# Patient Record
Sex: FEMALE | Race: WHITE | NOT HISPANIC OR LATINO | Employment: OTHER | URBAN - METROPOLITAN AREA
[De-identification: names, ages, dates, MRNs, and addresses within clinical notes are randomized per-mention and may not be internally consistent; named-entity substitution may affect disease eponyms.]

---

## 2017-09-01 ENCOUNTER — ALLSCRIPTS OFFICE VISIT (OUTPATIENT)
Dept: OTHER | Facility: OTHER | Age: 72
End: 2017-09-01

## 2017-11-21 DIAGNOSIS — Z12.31 ENCOUNTER FOR SCREENING MAMMOGRAM FOR MALIGNANT NEOPLASM OF BREAST: ICD-10-CM

## 2018-01-12 VITALS
DIASTOLIC BLOOD PRESSURE: 74 MMHG | WEIGHT: 115.38 LBS | HEIGHT: 61 IN | SYSTOLIC BLOOD PRESSURE: 116 MMHG | BODY MASS INDEX: 21.79 KG/M2

## 2018-10-04 ENCOUNTER — ANNUAL EXAM (OUTPATIENT)
Dept: OBGYN CLINIC | Facility: CLINIC | Age: 73
End: 2018-10-04
Payer: COMMERCIAL

## 2018-10-04 VITALS
HEIGHT: 61 IN | BODY MASS INDEX: 20.65 KG/M2 | WEIGHT: 109.4 LBS | DIASTOLIC BLOOD PRESSURE: 72 MMHG | SYSTOLIC BLOOD PRESSURE: 118 MMHG

## 2018-10-04 DIAGNOSIS — M81.0 OSTEOPOROSIS, UNSPECIFIED OSTEOPOROSIS TYPE, UNSPECIFIED PATHOLOGICAL FRACTURE PRESENCE: ICD-10-CM

## 2018-10-04 DIAGNOSIS — N95.2 ATROPHY OF VAGINA: ICD-10-CM

## 2018-10-04 DIAGNOSIS — D25.0 SUBMUCOUS LEIOMYOMA OF UTERUS: ICD-10-CM

## 2018-10-04 DIAGNOSIS — Z12.31 VISIT FOR SCREENING MAMMOGRAM: ICD-10-CM

## 2018-10-04 DIAGNOSIS — L90.0 LICHEN SCLEROSUS ET ATROPHICUS: ICD-10-CM

## 2018-10-04 DIAGNOSIS — Z11.51 SCREENING FOR HPV (HUMAN PAPILLOMAVIRUS): ICD-10-CM

## 2018-10-04 DIAGNOSIS — Z12.4 SCREENING FOR CERVICAL CANCER: Primary | ICD-10-CM

## 2018-10-04 PROBLEM — N90.7 VULVAR CYST: Status: RESOLVED | Noted: 2017-09-01 | Resolved: 2018-10-04

## 2018-10-04 PROBLEM — N90.7 VULVAR CYST: Status: ACTIVE | Noted: 2017-09-01

## 2018-10-04 PROCEDURE — G0145 SCR C/V CYTO,THINLAYER,RESCR: HCPCS | Performed by: OBSTETRICS & GYNECOLOGY

## 2018-10-04 PROCEDURE — 87624 HPV HI-RISK TYP POOLED RSLT: CPT | Performed by: OBSTETRICS & GYNECOLOGY

## 2018-10-04 PROCEDURE — G0101 CA SCREEN;PELVIC/BREAST EXAM: HCPCS | Performed by: OBSTETRICS & GYNECOLOGY

## 2018-10-04 RX ORDER — CLOBETASOL PROPIONATE 0.5 MG/G
OINTMENT TOPICAL 2 TIMES DAILY
COMMUNITY
Start: 2012-07-30 | End: 2018-10-04 | Stop reason: SDUPTHER

## 2018-10-04 RX ORDER — BUPROPION HYDROCHLORIDE 200 MG/1
TABLET, EXTENDED RELEASE ORAL
COMMUNITY
Start: 2011-02-17

## 2018-10-04 RX ORDER — CARBIDOPA AND LEVODOPA 50; 200 MG/1; MG/1
TABLET, EXTENDED RELEASE ORAL
COMMUNITY
Start: 2011-04-16

## 2018-10-04 RX ORDER — LORAZEPAM 0.5 MG/1
TABLET ORAL
COMMUNITY
Start: 2011-04-27

## 2018-10-04 RX ORDER — CLOBETASOL PROPIONATE 0.5 MG/G
OINTMENT TOPICAL
Qty: 30 G | Refills: 1 | Status: SHIPPED | OUTPATIENT
Start: 2018-10-04 | End: 2020-11-11 | Stop reason: SDUPTHER

## 2018-10-04 RX ORDER — SIMVASTATIN 40 MG
TABLET ORAL
COMMUNITY
Start: 2011-05-08

## 2018-10-04 NOTE — PROGRESS NOTES
Assessment/Plan:  1  Yearly exam-Pap smear done with HPV testing with patient request, self-breast awareness reviewed, calcium/vitamin-D recommendations discussed, mammogram request given, colonoscopy follow-up as per specialist   2   Previous exposure to HPV-patient's  was diagnosed with cancer of the tonsils with positive HPV  He is now 3 years in remission  Pap with HPV was negative July 2015  Pap with HPV was done today with disposition as per findings  3   Lichen sclerosis-patient continues to take clobetasol ointment 0 025%, using it topically 6 weeks on then 6 weeks off as recommended by Dermatology  Overall, she has only a small linear area of whitish discoloration on the left labia majora  Compared with previous exam, there are no whitish discoloration areas on the right or in the posterior midline area  This appears improved  She will continue clobetasol and prescription was sent  She will follow-up in 1 year time for exam   4   History of right vulvar cyst-none noted  5   History of osteopenia/osteoporosis-most recent DEXA scan 11/21/16 demonstrated osteopenia with focal osteoporosis  Patient did use calcium, vitamin-D, and weight-bearing exercise but declined medical treatment at that time  Repeat DEXA scan was ordered, postdated 11/21/18  We will proceed accordingly  6   History of submucous myoma-no recent bleeding  7   Vaginal atrophy-patient without symptoms  She is sexually active without any issues  She will call with any problems  Otherwise, she will follow up in 1 year for yearly exam/follow-up exam or as needed  No problem-specific Assessment & Plan notes found for this encounter  Diagnoses and all orders for this visit:    Screening for cervical cancer  -     Liquid-based pap, screening    Screening for HPV (human papillomavirus)  -     Liquid-based pap, screening    Visit for screening mammogram  -     Mammo screening bilateral w cad;  Future    Osteoporosis, unspecified osteoporosis type, unspecified pathological fracture presence  -     DXA bone density spine hip and pelvis; Future    Lichen sclerosus et atrophicus  -     clobetasol (TEMOVATE) 0 05 % ointment; Apply topically daily at bedtime as needed (Vulvar irritation/lichen sclerosis changes)    Submucous leiomyoma of uterus    Other orders  -     buPROPion (WELLBUTRIN SR) 200 MG 12 hr tablet; Take by mouth  -     carbidopa-levodopa (SINEMET CR)  mg per tablet; Take by mouth  -     Discontinue: clobetasol (TEMOVATE) 0 05 % ointment; Apply topically 2 (two) times a day  -     LORazepam (ATIVAN) 0 5 mg tablet; Take by mouth  -     simvastatin (ZOCOR) 40 mg tablet; Take by mouth          Subjective:      Patient ID: Ariela Trinidad is a 68 y o  female  Patient was seen today for yearly exam   Please see assessment plan for details  The following portions of the patient's history were reviewed and updated as appropriate: allergies, current medications, past family history, past medical history, past social history, past surgical history and problem list     Review of Systems   Constitutional: Negative for chills, diaphoresis, fatigue and fever  Respiratory: Negative for apnea, cough, chest tightness, shortness of breath and wheezing  Cardiovascular: Negative for chest pain, palpitations and leg swelling  Gastrointestinal: Negative for abdominal distention, abdominal pain, anal bleeding, constipation, diarrhea, nausea, rectal pain and vomiting  Genitourinary: Negative for difficulty urinating, dyspareunia, dysuria, frequency, hematuria, menstrual problem, pelvic pain, urgency, vaginal bleeding, vaginal discharge and vaginal pain  Musculoskeletal: Negative for arthralgias, back pain and myalgias  Skin: Negative for color change and rash  Neurological: Negative for dizziness, syncope, light-headedness, numbness and headaches  Hematological: Negative for adenopathy   Does not bruise/bleed easily  Psychiatric/Behavioral: Negative for dysphoric mood and sleep disturbance  The patient is not nervous/anxious  Objective:      /72 (BP Location: Left arm, Patient Position: Sitting, Cuff Size: Standard)   Ht 5' 1" (1 549 m)   Wt 49 6 kg (109 lb 6 4 oz)   BMI 20 67 kg/m²          Physical Exam    Objective      /72 (BP Location: Left arm, Patient Position: Sitting, Cuff Size: Standard)   Ht 5' 1" (1 549 m)   Wt 49 6 kg (109 lb 6 4 oz)   BMI 20 67 kg/m²     General:   alert and oriented, in no acute distress, alert, appears stated age and cooperative   Neck: normal to inspection and palpation   Breast: normal appearance, no masses or tenderness   Heart:    Lungs:    Abdomen: soft, non-tender, without masses or organomegaly   Vulva: Atrophic, with small area of linear white discoloration noted at the midportion of the left labia majora  No raised or firm or erythematous areas are appreciated  Vagina: Atrophic, without erythema or lesions or discharge  Cervix: Atrophic, without lesions or discharge or cervicitis    No Cervical motion tenderness   Uterus: top normal size, anteverted, non-tender   Adnexa: no mass, fullness, tenderness   Rectum: negative

## 2018-10-04 NOTE — PATIENT INSTRUCTIONS
Osteoporosis   WHAT YOU NEED TO KNOW:   What is osteoporosis? Osteoporosis is a long-term medical condition that causes your bones to become weak, brittle, and more likely to fracture  Osteoporosis occurs when your body absorbs more bone than it makes  It is also caused by a lack of calcium and estrogen (female hormone)  What increases my risk for osteoporosis? · Age older than 28    · Low estrogen levels    · Female gender    · Alcohol, tobacco, or caffeine    · Lack of calcium and vitamin D in your foods    · Lack of exercise    · Some illnesses, such as thyroid diseases, bone cancer, and long-term lung diseases    · Certain medicines, such as steroids, anticonvulsants, and blood-thinners  What are the signs and symptoms of osteoporosis? You may not have any signs or symptoms  You may break a bone after a muscle strain, bump, or fall  A break usually occurs in the hip, spine, or wrist  A collapsed vertebra (bone in your spine) may cause severe back pain or loss of height from bent posture  How is osteoporosis diagnosed? · Blood and urine tests  measure your calcium, vitamin D, and estrogen levels  · An x-ray or CT  may show thinned bones or a fracture  You may be given contrast liquid to help the bones show up better in the pictures  Tell the healthcare provider if you have ever had an allergic reaction to contrast liquid  Do not enter the MRI room with anything metal  Metal can cause serious injury  Tell the healthcare provider if you have any metal in or on your body  · A bone density test  compares your bone thickness with what is expected for someone of your age, gender, and ethnicity  How is osteoporosis treated? Medicines may be given to prevent bone loss, build new bone, and increase estrogen  These medicines help prevent fractures and may be given as a pill or injection  Ask your healthcare provider for more information on these medicines  How can I help prevent bone loss?    · Eat healthy foods that are high in calcium  This helps keep your bones strong  Good sources of calcium are milk, cheese, broccoli, tofu, almonds, and canned salmon and sardines  · Increase your vitamin D intake  Vitamin D is in fish oils, some vegetables, and fortified milk, cereal, and bread  Vitamin D is also formed in the skin when it is exposed to the sun  Ask your healthcare provider how much sunlight is safe for you  · Drink liquids as directed  Ask your healthcare provider how much liquid to drink each day and which liquids are best for you  Do not have alcohol or caffeine  They decrease bone mineral density, which can weaken your bones  · Exercise regularly  Ask your healthcare provider about the best exercise plan for you  Weight bearing exercise for 30 minutes, 3 times a week can help build and strengthen bone  · Do not smoke  Nicotine and other chemicals in cigarettes and cigars can cause lung damage  Ask your healthcare provider for information if you currently smoke and need help to quit  E-cigarettes or smokeless tobacco still contain nicotine  Talk to your healthcare provider before you use these products  · Go to physical therapy as directed  A physical therapist teaches you exercises to help improve movement and muscle strength  When should I seek immediate care? · You have severe pain  When should I contact my healthcare provider? · You have increasing pain after a fall  · You have pain when you do your daily activities  · You have questions or concerns about your condition or care  CARE AGREEMENT:   You have the right to help plan your care  Learn about your health condition and how it may be treated  Discuss treatment options with your caregivers to decide what care you want to receive  You always have the right to refuse treatment  The above information is an  only  It is not intended as medical advice for individual conditions or treatments  Talk to your doctor, nurse or pharmacist before following any medical regimen to see if it is safe and effective for you  © 2017 [employer] Information is for End User's use only and may not be sold, redistributed or otherwise used for commercial purposes  All illustrations and images included in CareNotes® are the copyrighted property of [employer]  or [employer]  Lichen Sclerosus   WHAT YOU NEED TO KNOW:   What is lichen sclerosus? Lichen sclerosus is a skin condition that most often affects the vulva, penis, or skin around the anus  The cause of lichen sclerosus may not be known  What are the signs and symptoms of lichen sclerosus? You may not have any symptoms, or you may have any of the following:  · Pain, itching, or burning    · Areas of skin that are thin, wrinkled, and white    · Blisters    · Bleeding, bruises, or tears on affected skin  How is lichen sclerosus diagnosed? Your healthcare provider will ask about your symptoms  He or she will also examine your skin  A sample of your skin may be taken and sent to a lab for tests  How is lichen sclerosus treated or managed? You do not need treatment if you do not have any symptoms  Your healthcare provider may recommend a steroid cream or ointment  Your provider may also recommend a topical medicine that prevents your immune system from attacking your skin  If you are female, your healthcare provider may recommend that you do not take bubble baths, or use scented soaps and scented detergents  This may help decrease irritation of the vulva  Rarely, adults may need surgery to repair scarring that can occur over time  When should I contact my healthcare provider? · Your symptoms do not get better with treatment  · You have questions or concerns about your condition or care  CARE AGREEMENT:   You have the right to help plan your care  Learn about your health condition and how it may be treated   Discuss treatment options with your caregivers to decide what care you want to receive  You always have the right to refuse treatment  The above information is an  only  It is not intended as medical advice for individual conditions or treatments  Talk to your doctor, nurse or pharmacist before following any medical regimen to see if it is safe and effective for you  © 2017 [employer] Information is for End User's use only and may not be sold, redistributed or otherwise used for commercial purposes  All illustrations and images included in CareNotes® are the copyrighted property of [employer] M [employer]  or [employer]ion Cunningham

## 2018-10-09 LAB
HPV HR 12 DNA CVX QL NAA+PROBE: NEGATIVE
HPV16 DNA CVX QL NAA+PROBE: NEGATIVE
HPV18 DNA CVX QL NAA+PROBE: NEGATIVE
LAB AP GYN PRIMARY INTERPRETATION: NORMAL
Lab: NORMAL

## 2018-10-10 ENCOUNTER — TELEPHONE (OUTPATIENT)
Dept: OBGYN CLINIC | Facility: CLINIC | Age: 73
End: 2018-10-10

## 2019-01-25 ENCOUNTER — TELEPHONE (OUTPATIENT)
Dept: OBGYN CLINIC | Facility: CLINIC | Age: 74
End: 2019-01-25

## 2019-01-25 NOTE — TELEPHONE ENCOUNTER
----- Message from James Mathur MD sent at 1/21/2019  8:10 AM EST -----  DEXA scan with osteopenia in the spine and osteopenia with focal osteoporosis in the hip  This is similar to her previous DEXA scan, but density has decreased 1 1% in the spine and 4 3% in the hip  Patient has previously refused any medications and she has continue with weight-bearing exercise, calcium, and vitamin-D  Please inform her the findings  If she is interested in considering medications, would recommend office visit to discuss this    Thanks

## 2019-01-25 NOTE — TELEPHONE ENCOUNTER
----- Message from Urszula Trinh MD sent at 1/21/2019  8:10 AM EST -----  DEXA scan with osteopenia in the spine and osteopenia with focal osteoporosis in the hip  This is similar to her previous DEXA scan, but density has decreased 1 1% in the spine and 4 3% in the hip  Patient has previously refused any medications and she has continue with weight-bearing exercise, calcium, and vitamin-D  Please inform her the findings  If she is interested in considering medications, would recommend office visit to discuss this    Thanks

## 2019-10-09 ENCOUNTER — ANNUAL EXAM (OUTPATIENT)
Dept: OBGYN CLINIC | Facility: CLINIC | Age: 74
End: 2019-10-09
Payer: COMMERCIAL

## 2019-10-09 VITALS
WEIGHT: 109 LBS | HEIGHT: 61 IN | SYSTOLIC BLOOD PRESSURE: 104 MMHG | DIASTOLIC BLOOD PRESSURE: 68 MMHG | BODY MASS INDEX: 20.58 KG/M2

## 2019-10-09 DIAGNOSIS — L90.0 LICHEN SCLEROSUS ET ATROPHICUS: ICD-10-CM

## 2019-10-09 DIAGNOSIS — M81.0 OSTEOPOROSIS, UNSPECIFIED OSTEOPOROSIS TYPE, UNSPECIFIED PATHOLOGICAL FRACTURE PRESENCE: ICD-10-CM

## 2019-10-09 DIAGNOSIS — D25.0 SUBMUCOUS LEIOMYOMA OF UTERUS: ICD-10-CM

## 2019-10-09 DIAGNOSIS — Z01.419 WOMEN'S ANNUAL ROUTINE GYNECOLOGICAL EXAMINATION: ICD-10-CM

## 2019-10-09 DIAGNOSIS — Z12.31 ENCOUNTER FOR SCREENING MAMMOGRAM FOR MALIGNANT NEOPLASM OF BREAST: Primary | ICD-10-CM

## 2019-10-09 DIAGNOSIS — N95.2 VAGINAL ATROPHY: ICD-10-CM

## 2019-10-09 PROCEDURE — G0101 CA SCREEN;PELVIC/BREAST EXAM: HCPCS | Performed by: OBSTETRICS & GYNECOLOGY

## 2019-10-09 NOTE — PATIENT INSTRUCTIONS
Vaginal Atrophy   WHAT YOU NEED TO KNOW:   What is vaginal atrophy? Vaginal atrophy is a condition that causes thinning, drying, and inflammation of vaginal tissue  This condition is caused by decreased levels of estrogen (a female sex hormone)  Vaginal atrophy can increase your risk for vaginal and urinary tract infections  Vaginal atrophy can worsen over time if not treated  What causes or increases your risk of vaginal atrophy? · Menopause     · Medicines that lower your estrogen levels, such as those used to treat breast cancer, endometriosis, or fibroids    · Radiation to your pelvic area     · Surgery to remove the ovaries    · Breastfeeding  What are the signs and symptoms of vaginal atrophy? · Vaginal dryness, itching, and burning    · Vaginal discharge    · Pain or discomfort during sex    · Light bleeding after sex    · Burning during urination    · Frequent, sudden, strong urges to urinate    · Urinary incontinence (loss of control of your bladder)  How is vaginal atrophy diagnosed? Your healthcare provider will ask about your symptoms  A pelvic exam will be done to examine your vagina and cervix  Your healthcare provider will place a speculum into your vagina to open and examine it  A sample of discharge from your vagina may be collected and tested  A urine test may also be done  How is vaginal atrophy treated? · Over-the counter vaginal moisturizers  can help reduce dryness  Your healthcare provider may recommend that you use a vaginal moisturizer several times each week and during sex  Only use creams that are made for vaginal use  Do  not  use petroleum jelly  Lubricants can be used during sex to decrease pain and discomfort  · Estrogen  may help decrease dryness  It may also lower your risk of vaginal infections if you are going through menopause  It can also help to relieve urinary symptoms  Estrogen may be prescribed in the form of a cream, tablet, or ring   These medicines can be applied or inserted into the vagina  Estrogen can also be prescribed in the form of a pill  When should I contact my healthcare provider? · You have a foul-smelling odor coming from your vagina  · You have a thick, cheese-like discharge from your vagina  · You have itching, swelling, or redness in your vagina  · You have pain or burning when you urinate  · Your urine smells bad  · Your symptoms do not improve, or they get worse  · You have questions or concerns about your condition or care  CARE AGREEMENT:   You have the right to help plan your care  Learn about your health condition and how it may be treated  Discuss treatment options with your caregivers to decide what care you want to receive  You always have the right to refuse treatment  The above information is an  only  It is not intended as medical advice for individual conditions or treatments  Talk to your doctor, nurse or pharmacist before following any medical regimen to see if it is safe and effective for you  © 2017 [employer] Information is for End User's use only and may not be sold, redistributed or otherwise used for commercial purposes  All illustrations and images included in CareNotes® are the copyrighted property of [employer]  or [employer] Cunningham  Lichen Sclerosus   WHAT YOU NEED TO KNOW:   What is lichen sclerosus? Lichen sclerosus is a skin condition that most often affects the vulva, penis, or skin around the anus  The cause of lichen sclerosus may not be known  What are the signs and symptoms of lichen sclerosus? You may not have any symptoms, or you may have any of the following:  · Pain, itching, or burning    · Areas of skin that are thin, wrinkled, and white    · Blisters    · Bleeding, bruises, or tears on affected skin  How is lichen sclerosus diagnosed? Your healthcare provider will ask about your symptoms  He or she will also examine your skin   A sample of your skin may be taken and sent to a lab for tests  How is lichen sclerosus treated or managed? You do not need treatment if you do not have any symptoms  Your healthcare provider may recommend a steroid cream or ointment  Your provider may also recommend a topical medicine that prevents your immune system from attacking your skin  If you are female, your healthcare provider may recommend that you do not take bubble baths, or use scented soaps and scented detergents  This may help decrease irritation of the vulva  Rarely, adults may need surgery to repair scarring that can occur over time  When should I contact my healthcare provider? · Your symptoms do not get better with treatment  · You have questions or concerns about your condition or care  CARE AGREEMENT:   You have the right to help plan your care  Learn about your health condition and how it may be treated  Discuss treatment options with your caregivers to decide what care you want to receive  You always have the right to refuse treatment  The above information is an  only  It is not intended as medical advice for individual conditions or treatments  Talk to your doctor, nurse or pharmacist before following any medical regimen to see if it is safe and effective for you  © 2017 [address] [facility] Information is for End User's use only and may not be sold, redistributed or otherwise used for commercial purposes  All illustrations and images included in CareNotes® are the copyrighted property of [employer]  or [employer] Cunningham  Osteoporosis   WHAT YOU NEED TO KNOW:   What is osteoporosis? Osteoporosis is a long-term medical condition that causes your bones to become weak, brittle, and more likely to fracture  Osteoporosis occurs when your body absorbs more bone than it makes  It is also caused by a lack of calcium and estrogen (female hormone)  What increases my risk for osteoporosis?    · Age older than 35    · Low estrogen levels    · Female gender    · Alcohol, tobacco, or caffeine    · Lack of calcium and vitamin D in your foods    · Lack of exercise    · Some illnesses, such as thyroid diseases, bone cancer, and long-term lung diseases    · Certain medicines, such as steroids, anticonvulsants, and blood-thinners  What are the signs and symptoms of osteoporosis? You may not have any signs or symptoms  You may break a bone after a muscle strain, bump, or fall  A break usually occurs in the hip, spine, or wrist  A collapsed vertebra (bone in your spine) may cause severe back pain or loss of height from bent posture  How is osteoporosis diagnosed? · Blood and urine tests  measure your calcium, vitamin D, and estrogen levels  · An x-ray or CT  may show thinned bones or a fracture  You may be given contrast liquid to help the bones show up better in the pictures  Tell the healthcare provider if you have ever had an allergic reaction to contrast liquid  Do not enter the MRI room with anything metal  Metal can cause serious injury  Tell the healthcare provider if you have any metal in or on your body  · A bone density test  compares your bone thickness with what is expected for someone of your age, gender, and ethnicity  How is osteoporosis treated? Medicines may be given to prevent bone loss, build new bone, and increase estrogen  These medicines help prevent fractures and may be given as a pill or injection  Ask your healthcare provider for more information on these medicines  How can I help prevent bone loss? · Eat healthy foods that are high in calcium  This helps keep your bones strong  Good sources of calcium are milk, cheese, broccoli, tofu, almonds, and canned salmon and sardines  · Increase your vitamin D intake  Vitamin D is in fish oils, some vegetables, and fortified milk, cereal, and bread  Vitamin D is also formed in the skin when it is exposed to the sun   Ask your healthcare provider how much sunlight is safe for you  · Drink liquids as directed  Ask your healthcare provider how much liquid to drink each day and which liquids are best for you  Do not have alcohol or caffeine  They decrease bone mineral density, which can weaken your bones  · Exercise regularly  Ask your healthcare provider about the best exercise plan for you  Weight bearing exercise for 30 minutes, 3 times a week can help build and strengthen bone  · Do not smoke  Nicotine and other chemicals in cigarettes and cigars can cause lung damage  Ask your healthcare provider for information if you currently smoke and need help to quit  E-cigarettes or smokeless tobacco still contain nicotine  Talk to your healthcare provider before you use these products  · Go to physical therapy as directed  A physical therapist teaches you exercises to help improve movement and muscle strength  When should I seek immediate care? · You have severe pain  When should I contact my healthcare provider? · You have increasing pain after a fall  · You have pain when you do your daily activities  · You have questions or concerns about your condition or care  CARE AGREEMENT:   You have the right to help plan your care  Learn about your health condition and how it may be treated  Discuss treatment options with your caregivers to decide what care you want to receive  You always have the right to refuse treatment  The above information is an  only  It is not intended as medical advice for individual conditions or treatments  Talk to your doctor, nurse or pharmacist before following any medical regimen to see if it is safe and effective for you  © 2017 [address] [employer] Information is for End User's use only and may not be sold, redistributed or otherwise used for commercial purposes   All illustrations and images included in CareNotes® are the copyrighted property of [employer]  or [employer]

## 2019-10-09 NOTE — PROGRESS NOTES
Assessment/Plan   Diagnoses and all orders for this visit:    Encounter for screening mammogram for malignant neoplasm of breast  -     Mammo screening bilateral w cad; Future    Osteoporosis, unspecified osteoporosis type, unspecified pathological fracture presence    Lichen sclerosus et atrophicus    Submucous leiomyoma of uterus    Women's annual routine gynecological examination    Vaginal atrophy     1  Yearly exam-Pap smear deferred, self-breast awareness reviewed, calcium/vitamin-D recommendations discussed, mammogram request given, colonoscopy as per specialist  2  Previous exposure HPV-patient with Pap/HPV negative October 2018  Pap was deferred today as per current guidelines with patient agreement  3  Lichen sclerosis-continues to take clobetasol ointment 0 025% using it topically weekly for 6 weeks on then 6 weeks off as recommended by Dermatology  Scant whitish discoloration noted in the midportion of the labia minora noted bilaterally, without any raised or suspicious findings  Overall, this is improved from prior years  She will continue this treatment  She will call when prescription is needed  4  History of submucous myoma/right vulvar cyst-no current symptoms  5  Vaginal atrophy-patient with mild changes noted  She denies any dyspareunia  She was given the vaginal lubrication/moisturizer sheet and will call with any issues  6  Osteopenia/focal osteoporosis-patient had most recent DEXA scan January 2019 with continued osteopenia in the spine but focal osteoporosis in the left femoral neck with T-score-2 8  Previous DEXA scan from 2016 had T-score -2 7, a 4 percent decreased was noted over this time  Patient was again counseled about calcium, vitamin-D, weight-bearing exercises and was strongly encouraged to consider medications  We discussed Fosamax, Actonel, Boniva, Reclast, and Prolia  She states she discuss this with her primary doctor and no medication was started    She was counseled about increased risk for hip fracture with this finding  She does have appointment with Dr Cortez in the next few weeks and she will review with him again  Otherwise, follow-up 1 year for yearly exam or as needed  7  Other-grandchildren are ages 15 through 23  My congratulations were given  Subjective   Patient ID: Marlon Mejia is a 69 y o  female  Vitals:    10/09/19 0918   BP: 104/68     Patient was seen today for yearly exam   Please see assessment plan for details  The following portions of the patient's history were reviewed and updated as appropriate: allergies, current medications, past family history, past medical history, past social history, past surgical history and problem list   Past Medical History:   Diagnosis Date    Submucous leiomyoma of uterus 2014     History reviewed  No pertinent surgical history    OB History    Para Term  AB Living   3 2 2   1 2   SAB TAB Ectopic Multiple Live Births   1              # Outcome Date GA Lbr Leroy/2nd Weight Sex Delivery Anes PTL Lv   3 SAB            2 Term            1 Term                Current Outpatient Medications:     buPROPion (WELLBUTRIN SR) 200 MG 12 hr tablet, Take by mouth, Disp: , Rfl:     carbidopa-levodopa (SINEMET CR)  mg per tablet, Take by mouth, Disp: , Rfl:     clobetasol (TEMOVATE) 0 05 % ointment, Apply topically daily at bedtime as needed (Vulvar irritation/lichen sclerosis changes), Disp: 30 g, Rfl: 1    LORazepam (ATIVAN) 0 5 mg tablet, Take by mouth, Disp: , Rfl:     simvastatin (ZOCOR) 40 mg tablet, Take by mouth, Disp: , Rfl:   Allergies   Allergen Reactions    Aspirin      Reaction Date: ;      Social History     Socioeconomic History    Marital status: /Civil Union     Spouse name: None    Number of children: None    Years of education: None    Highest education level: None   Occupational History    None   Social Needs    Financial resource strain: None  Food insecurity:     Worry: None     Inability: None    Transportation needs:     Medical: None     Non-medical: None   Tobacco Use    Smoking status: Never Smoker    Smokeless tobacco: Never Used   Substance and Sexual Activity    Alcohol use: No    Drug use: No    Sexual activity: None   Lifestyle    Physical activity:     Days per week: None     Minutes per session: None    Stress: None   Relationships    Social connections:     Talks on phone: None     Gets together: None     Attends Voodoo service: None     Active member of club or organization: None     Attends meetings of clubs or organizations: None     Relationship status: None    Intimate partner violence:     Fear of current or ex partner: None     Emotionally abused: None     Physically abused: None     Forced sexual activity: None   Other Topics Concern    None   Social History Narrative    None     Family History   Problem Relation Age of Onset    No Known Problems Mother     No Known Problems Father        Review of Systems   Constitutional: Negative for chills, diaphoresis, fatigue and fever  Respiratory: Negative for apnea, cough, chest tightness, shortness of breath and wheezing  Cardiovascular: Negative for chest pain, palpitations and leg swelling  Gastrointestinal: Negative for abdominal distention, abdominal pain, anal bleeding, constipation, diarrhea, nausea, rectal pain and vomiting  Genitourinary: Negative for difficulty urinating, dyspareunia, dysuria, frequency, hematuria, menstrual problem, pelvic pain, urgency, vaginal bleeding, vaginal discharge and vaginal pain  Musculoskeletal: Negative for arthralgias, back pain and myalgias  Skin: Negative for color change and rash  Neurological: Negative for dizziness, syncope, light-headedness, numbness and headaches  Hematological: Negative for adenopathy  Does not bruise/bleed easily     Psychiatric/Behavioral: Negative for dysphoric mood and sleep disturbance  The patient is not nervous/anxious  Objective   Physical Exam    Objective      /68 (BP Location: Left arm, Patient Position: Sitting, Cuff Size: Large)   Ht 5' 1" (1 549 m)   Wt 49 4 kg (109 lb)   BMI 20 60 kg/m²     General:   alert and oriented, in no acute distress   Neck: normal to inspection and palpation   Breast: normal appearance, no masses or tenderness   Heart:    Lungs:    Abdomen: soft, non-tender, without masses or organomegaly   Vulva: normal, with slight whitish discoloration in the midportion of the labia minora bilaterally without raised or firm or erythematous changes  Vagina: Without erythema or lesions or discharge  Mildly atrophic   Cervix: Without lesions or discharge or cervicitis  No Cervical motion tenderness    Mildly atrophic   Uterus: top normal size, anteverted, non-tender   Adnexa: no mass, fullness, tenderness   Rectum: negative    Psych:  Normal mood and affect   Skin:  Without obvious lesions   Eyes: symmetric, with normal movements and reactivity   Musculoskeletal:  Normal muscle tone and movements appreciated

## 2020-11-11 ENCOUNTER — ANNUAL EXAM (OUTPATIENT)
Dept: OBGYN CLINIC | Facility: CLINIC | Age: 75
End: 2020-11-11
Payer: COMMERCIAL

## 2020-11-11 VITALS
BODY MASS INDEX: 20.39 KG/M2 | DIASTOLIC BLOOD PRESSURE: 78 MMHG | SYSTOLIC BLOOD PRESSURE: 118 MMHG | WEIGHT: 108 LBS | TEMPERATURE: 96.7 F | HEIGHT: 61 IN

## 2020-11-11 DIAGNOSIS — M81.0 OSTEOPOROSIS, UNSPECIFIED OSTEOPOROSIS TYPE, UNSPECIFIED PATHOLOGICAL FRACTURE PRESENCE: ICD-10-CM

## 2020-11-11 DIAGNOSIS — Z12.31 ENCOUNTER FOR SCREENING MAMMOGRAM FOR MALIGNANT NEOPLASM OF BREAST: ICD-10-CM

## 2020-11-11 DIAGNOSIS — R10.2 PELVIC PRESSURE IN FEMALE: ICD-10-CM

## 2020-11-11 DIAGNOSIS — N95.2 VAGINAL ATROPHY: ICD-10-CM

## 2020-11-11 DIAGNOSIS — L90.0 LICHEN SCLEROSUS ET ATROPHICUS: Primary | ICD-10-CM

## 2020-11-11 PROCEDURE — 99214 OFFICE O/P EST MOD 30 MIN: CPT | Performed by: OBSTETRICS & GYNECOLOGY

## 2020-11-11 RX ORDER — CLOBETASOL PROPIONATE 0.5 MG/G
OINTMENT TOPICAL
Qty: 30 G | Refills: 1 | Status: SHIPPED | OUTPATIENT
Start: 2020-11-11 | End: 2021-11-24 | Stop reason: SDUPTHER

## 2021-04-07 DIAGNOSIS — Z12.31 ENCOUNTER FOR SCREENING MAMMOGRAM FOR MALIGNANT NEOPLASM OF BREAST: ICD-10-CM

## 2021-04-07 DIAGNOSIS — M81.0 OSTEOPOROSIS, UNSPECIFIED OSTEOPOROSIS TYPE, UNSPECIFIED PATHOLOGICAL FRACTURE PRESENCE: ICD-10-CM

## 2021-11-24 ENCOUNTER — ANNUAL EXAM (OUTPATIENT)
Dept: OBGYN CLINIC | Facility: CLINIC | Age: 76
End: 2021-11-24
Payer: COMMERCIAL

## 2021-11-24 VITALS
WEIGHT: 109 LBS | HEIGHT: 61 IN | BODY MASS INDEX: 20.58 KG/M2 | DIASTOLIC BLOOD PRESSURE: 70 MMHG | SYSTOLIC BLOOD PRESSURE: 126 MMHG

## 2021-11-24 DIAGNOSIS — N95.2 VAGINAL ATROPHY: ICD-10-CM

## 2021-11-24 DIAGNOSIS — L90.0 LICHEN SCLEROSUS ET ATROPHICUS: ICD-10-CM

## 2021-11-24 DIAGNOSIS — Z01.419 WOMEN'S ANNUAL ROUTINE GYNECOLOGICAL EXAMINATION: Primary | ICD-10-CM

## 2021-11-24 DIAGNOSIS — M81.0 OSTEOPOROSIS, UNSPECIFIED OSTEOPOROSIS TYPE, UNSPECIFIED PATHOLOGICAL FRACTURE PRESENCE: ICD-10-CM

## 2021-11-24 DIAGNOSIS — Z12.31 ENCOUNTER FOR SCREENING MAMMOGRAM FOR MALIGNANT NEOPLASM OF BREAST: ICD-10-CM

## 2021-11-24 DIAGNOSIS — D25.0 SUBMUCOUS LEIOMYOMA OF UTERUS: ICD-10-CM

## 2021-11-24 PROCEDURE — G0145 SCR C/V CYTO,THINLAYER,RESCR: HCPCS | Performed by: OBSTETRICS & GYNECOLOGY

## 2021-11-24 PROCEDURE — G0101 CA SCREEN;PELVIC/BREAST EXAM: HCPCS | Performed by: OBSTETRICS & GYNECOLOGY

## 2021-11-24 RX ORDER — BUPROPION HYDROCHLORIDE 150 MG/1
TABLET, EXTENDED RELEASE ORAL
COMMUNITY
Start: 2021-10-28

## 2021-11-24 RX ORDER — LORAZEPAM 1 MG/1
TABLET ORAL
COMMUNITY
Start: 2021-11-03

## 2021-11-24 RX ORDER — CLOBETASOL PROPIONATE 0.5 MG/G
OINTMENT TOPICAL
Qty: 30 G | Refills: 2 | Status: SHIPPED | OUTPATIENT
Start: 2021-11-24

## 2021-12-01 LAB
LAB AP GYN PRIMARY INTERPRETATION: NORMAL
Lab: NORMAL

## 2022-11-30 ENCOUNTER — OFFICE VISIT (OUTPATIENT)
Dept: GYNECOLOGY | Facility: CLINIC | Age: 77
End: 2022-11-30

## 2022-11-30 VITALS
BODY MASS INDEX: 20.43 KG/M2 | SYSTOLIC BLOOD PRESSURE: 132 MMHG | WEIGHT: 108.2 LBS | DIASTOLIC BLOOD PRESSURE: 72 MMHG | HEIGHT: 61 IN

## 2022-11-30 DIAGNOSIS — Z12.31 ENCOUNTER FOR SCREENING MAMMOGRAM FOR BREAST CANCER: ICD-10-CM

## 2022-11-30 DIAGNOSIS — M81.0 OSTEOPOROSIS, UNSPECIFIED OSTEOPOROSIS TYPE, UNSPECIFIED PATHOLOGICAL FRACTURE PRESENCE: ICD-10-CM

## 2022-11-30 DIAGNOSIS — N95.2 VAGINAL ATROPHY: ICD-10-CM

## 2022-11-30 DIAGNOSIS — L90.0 LICHEN SCLEROSUS ET ATROPHICUS: Primary | ICD-10-CM

## 2022-11-30 NOTE — PROGRESS NOTES
Assessment/Plan   Diagnoses and all orders for this visit:    Lichen sclerosus et atrophicus    Encounter for screening mammogram for breast cancer  -     Mammo screening bilateral w 3d & cad; Future    Osteoporosis, unspecified osteoporosis type, unspecified pathological fracture presence  -     DXA bone density spine hip and pelvis; Future    Vaginal atrophy    1  General exam-Pap smear deferred, self-breast awareness reviewed, calcium/vitamin-D recommendations discussed, mammogram request given, colonoscopy every 5 years as per specialist   2  Lichen sclerosis-has not used clobetasol for some time now as it has not been symptomatic  Only faint hypopigmented area noted at the midline perineum, without firm or raised areas appreciated  Very faint hypopigmented area noted at the inferior portion of the right areola as well  She will continue to use clobetasol as needed  She does not require prescription today  She will call or return with any issues  Follow-up yearly evaluation  3  Prior history of HPV-Pap with HPV was negative October 2018, Pap was 11/24/21  Pap was deferred today as per current guidelines patient agreement  4  Prior history submucous myoma/pelvic pressure-no current concerns  5  Inclusion cyst-occasional cyst noted on the bilateral labia along with varicose veins  Patient counseled about this, no intervention recommended  To call or return with any symptoms  6  Vaginal atrophy-mild changes noted on exam   Vaginal lubrication/moisturizer sheet given, to use accordingly  7  Osteoporosis/osteopenia-noted from DEXA scan 4/5/21 with lumbar spine T-score-1 5, femoral neck-3 2  Calcium, vitamin-D, weight-bearing exercise recommended  She was started on Fosamax shortly after April 2021 visit by primary doctor  Request for DEXA scan given, postdated 4/5/23  Follow-up 1 year for yearly exam as needed  Subjective   Patient ID: Shanice Tan is a 69 y o  female      Vitals:    11/30/22 0853   BP: 132/72     Patient was seen today for follow-up visit  Please see assessment plan for details  The following portions of the patient's history were reviewed and updated as appropriate: allergies, current medications, past family history, past medical history, past social history, past surgical history and problem list   Past Medical History:   Diagnosis Date   • Submucous leiomyoma of uterus 2014     History reviewed  No pertinent surgical history    OB History    Para Term  AB Living   3 2 2   1 2   SAB IAB Ectopic Multiple Live Births   1              # Outcome Date GA Lbr Leroy/2nd Weight Sex Delivery Anes PTL Lv   3 SAB            2 Term            1 Term                Current Outpatient Medications:   •  buPROPion (WELLBUTRIN SR) 200 MG 12 hr tablet, Take by mouth, Disp: , Rfl:   •  carbidopa-levodopa (SINEMET CR)  mg per tablet, Take by mouth, Disp: , Rfl:   •  clobetasol (TEMOVATE) 0 05 % ointment, Apply topically daily at bedtime as needed (Vulvar irritation/lichen sclerosis changes), Disp: 30 g, Rfl: 2  •  LORazepam (ATIVAN) 0 5 mg tablet, Take by mouth, Disp: , Rfl:   •  simvastatin (ZOCOR) 40 mg tablet, Take by mouth, Disp: , Rfl:   •  buPROPion (WELLBUTRIN SR) 150 mg 12 hr tablet, , Disp: , Rfl:   •  LORazepam (ATIVAN) 1 mg tablet, , Disp: , Rfl:   Allergies   Allergen Reactions   • Aspirin      Reaction Date: ;      Social History     Socioeconomic History   • Marital status: /Civil Union     Spouse name: None   • Number of children: None   • Years of education: None   • Highest education level: None   Occupational History   • None   Tobacco Use   • Smoking status: Never   • Smokeless tobacco: Never   Vaping Use   • Vaping Use: Never used   Substance and Sexual Activity   • Alcohol use: No   • Drug use: No   • Sexual activity: Not Currently     Partners: Male   Other Topics Concern   • None   Social History Narrative   • None     Social Determinants of Health     Financial Resource Strain: Not on file   Food Insecurity: Not on file   Transportation Needs: Not on file   Physical Activity: Not on file   Stress: Not on file   Social Connections: Not on file   Intimate Partner Violence: Not on file   Housing Stability: Not on file     Family History   Problem Relation Age of Onset   • No Known Problems Mother    • No Known Problems Father        Review of Systems   Constitutional: Negative for chills, diaphoresis, fatigue and fever  Respiratory: Negative for apnea, cough, chest tightness, shortness of breath and wheezing  Cardiovascular: Negative for chest pain, palpitations and leg swelling  Gastrointestinal: Negative for abdominal distention, abdominal pain, anal bleeding, constipation, diarrhea, nausea, rectal pain and vomiting  Genitourinary: Negative for difficulty urinating, dyspareunia, dysuria, frequency, hematuria, menstrual problem, pelvic pain, urgency, vaginal bleeding, vaginal discharge and vaginal pain  Musculoskeletal: Negative for arthralgias, back pain and myalgias  Skin: Negative for color change and rash  Neurological: Negative for dizziness, syncope, light-headedness, numbness and headaches  Hematological: Negative for adenopathy  Does not bruise/bleed easily  Psychiatric/Behavioral: Negative for dysphoric mood and sleep disturbance  The patient is not nervous/anxious  Objective   Physical Exam  OBGyn Exam     Objective      /72 (BP Location: Right arm, Patient Position: Sitting)   Ht 5' 1" (1 549 m)   Wt 49 1 kg (108 lb 3 2 oz)   BMI 20 44 kg/m²     General:   alert and oriented, in no acute distress   Neck: normal to inspection and palpation   Breast: normal appearance, no masses or tenderness  Extremely faint hypopigmented area noted at the inferior portion of the areola, without raised or firm areas noted     Heart:    Lungs:    Abdomen: soft, non-tender, without masses or organomegaly   Vulva: Scant hypopigmented change noted at the midline vagina, less than 1 cm diameter  No raised or firm areas appreciated  Vagina: Mildly atrophic, without erythema or lesions or discharge  Cervix: Mildly atrophic, without lesions or discharge or cervicitis    No Cervical motion tenderness   Uterus: top normal size, anteverted, non-tender   Adnexa: no mass, fullness, tenderness   Rectum: negative    Psych:  Normal mood and affect   Skin:  Without obvious lesions   Eyes: symmetric, with normal movements and reactivity   Musculoskeletal:  Normal muscle tone and movements appreciated

## 2023-01-31 ENCOUNTER — TELEPHONE (OUTPATIENT)
Dept: GYNECOLOGY | Facility: CLINIC | Age: 78
End: 2023-01-31

## 2023-04-12 DIAGNOSIS — M81.0 OSTEOPOROSIS, UNSPECIFIED OSTEOPOROSIS TYPE, UNSPECIFIED PATHOLOGICAL FRACTURE PRESENCE: ICD-10-CM

## 2023-05-15 DIAGNOSIS — Z12.31 ENCOUNTER FOR SCREENING MAMMOGRAM FOR MALIGNANT NEOPLASM OF BREAST: ICD-10-CM

## 2023-10-23 ENCOUNTER — TELEPHONE (OUTPATIENT)
Dept: GYNECOLOGY | Facility: CLINIC | Age: 78
End: 2023-10-23

## 2023-10-23 NOTE — TELEPHONE ENCOUNTER
Patient called stating she has a a lump and feels something falling out of her vagina and is very uncomfortable. Appointment was given.

## 2023-10-24 ENCOUNTER — OFFICE VISIT (OUTPATIENT)
Dept: GYNECOLOGY | Facility: CLINIC | Age: 78
End: 2023-10-24
Payer: COMMERCIAL

## 2023-10-24 ENCOUNTER — TELEPHONE (OUTPATIENT)
Dept: GYNECOLOGY | Facility: CLINIC | Age: 78
End: 2023-10-24

## 2023-10-24 VITALS — BODY MASS INDEX: 19.58 KG/M2 | DIASTOLIC BLOOD PRESSURE: 68 MMHG | WEIGHT: 103.6 LBS | SYSTOLIC BLOOD PRESSURE: 126 MMHG

## 2023-10-24 DIAGNOSIS — N81.10 PROLAPSE OF ANTERIOR VAGINAL WALL: Primary | ICD-10-CM

## 2023-10-24 DIAGNOSIS — N81.4 UTERINE PROLAPSE: ICD-10-CM

## 2023-10-24 PROCEDURE — 99213 OFFICE O/P EST LOW 20 MIN: CPT | Performed by: OBSTETRICS & GYNECOLOGY

## 2023-10-24 RX ORDER — CHOLESTYRAMINE 4 G/9G
POWDER, FOR SUSPENSION ORAL
COMMUNITY
Start: 2023-10-12

## 2023-10-24 RX ORDER — PREGABALIN 50 MG/1
CAPSULE ORAL
COMMUNITY
Start: 2023-09-29

## 2023-10-24 RX ORDER — ALENDRONATE SODIUM 70 MG/1
TABLET ORAL
COMMUNITY
Start: 2023-07-21

## 2023-10-24 RX ORDER — DOXEPIN HYDROCHLORIDE 10 MG/1
CAPSULE ORAL
COMMUNITY
Start: 2023-08-29

## 2023-10-24 RX ORDER — METHYLPREDNISOLONE 4 MG/1
TABLET ORAL
COMMUNITY
Start: 2023-07-26

## 2023-10-24 NOTE — PROGRESS NOTES
Assessment/Plan:     Prolapse-overall, she has laxity of the vaginal tissue and also vaginal atrophy. She does not have any significant prolapse noted although she may have noticed worsening of symptoms because she was doing a great deal of walking at the time. We discussed treatment of prolapse such as pessary and surgery. We also discussed the use of vaginal estrogen to help with symptoms and sometimes pelvic floor physical therapy. I gave her information to review on prolapse. She has an appointment with Dr. India Valencia next month and she will review this with him. There are no diagnoses linked to this encounter. Subjective:     Patient ID: Chas Crowell is a 69 y.o. female. Patient here for evaluation of vaginal mass. She has been doing a great deal of walking lately as her  is receiving radiation treatments in [place] and they are taking the train there every day. Yesterday, they were walking and she had the feeling that she passed a blood clot, similar to when she had her menstrual cycle. When she looked, there was no blood clot or discharge but she noticed a "flesh colored" round mass at the opening of the vagina. She has never noticed this before and did not have any pain. She has had no bleeding. Other than the increase in the amount of walking, she has not been doing any heavy lifting. She has had 3 pregnancies and 3 vaginal deliveries. Incidentally, she had a normal CT scan of her abdomen and pelvis at the beginning of the month. This was done due to weight loss of 8 pounds, a rash and irritable bowel syndrome. She states it was normal.          Review of Systems   Constitutional: Negative. Gastrointestinal: Negative. Genitourinary: Negative. Negative for dysuria, pelvic pain, vaginal bleeding, vaginal discharge and vaginal pain. Objective:     Physical Exam  Genitourinary:     General: Normal vulva.       Vagina: Normal.      Cervix: Normal. Uterus: Normal. With uterine prolapse. Adnexa: Right adnexa normal and left adnexa normal.      Comments: Patient examined in the supine and standing position. There was laxity of the vaginal tissue and minimal uterine prolapse, cystocele and possibly rectocele noted. There was no significant prolapse/descent.

## 2023-10-24 NOTE — TELEPHONE ENCOUNTER
Called patient and left detailed message with results and she is to follow up with her family doctor regarding this if she has not already done so.    ----- Message from Jessica Hull DO sent at 10/24/2023  2:22 PM EDT -----  Please let patient know that I received the CT report that she had earlier this month. There was a small amount of fluid around her heart and  an area in the rectum that needs further evaluation, possibly with colonoscopy. She may be aware of this but she should follow-up with her family doctor. Pelvic organs were unremarkable.     Thank you

## 2023-10-25 ENCOUNTER — TELEPHONE (OUTPATIENT)
Dept: GYNECOLOGY | Facility: CLINIC | Age: 78
End: 2023-10-25

## 2023-10-25 NOTE — TELEPHONE ENCOUNTER
Patient called back to discuss findings. She did have colonoscopy right after the CT and patient states her PCP was not concerned about the pericardial fluid. The patient is concerned but I told her Dr Nica Mo just wanted to make sure the findings were addressed by her PCP as they are not in Dr. Mauyr Hudson area of expertise. The patient thanked Dr. Nica Mo for her concern.

## 2023-10-25 NOTE — TELEPHONE ENCOUNTER
Pt called that she received some results from Zoila S Alexis Castañeda about her ct scan and wanted to go over them, and also her colonoscopy, asked if she could be called about them.

## 2023-12-06 ENCOUNTER — OFFICE VISIT (OUTPATIENT)
Dept: GYNECOLOGY | Facility: CLINIC | Age: 78
End: 2023-12-06
Payer: COMMERCIAL

## 2023-12-06 VITALS
DIASTOLIC BLOOD PRESSURE: 64 MMHG | BODY MASS INDEX: 19.79 KG/M2 | HEIGHT: 61 IN | WEIGHT: 104.8 LBS | SYSTOLIC BLOOD PRESSURE: 118 MMHG

## 2023-12-06 DIAGNOSIS — L90.0 LICHEN SCLEROSUS ET ATROPHICUS: ICD-10-CM

## 2023-12-06 DIAGNOSIS — R10.2 PELVIC PRESSURE IN FEMALE: ICD-10-CM

## 2023-12-06 DIAGNOSIS — N81.6 RECTOCELE: ICD-10-CM

## 2023-12-06 DIAGNOSIS — Z01.419 WOMEN'S ANNUAL ROUTINE GYNECOLOGICAL EXAMINATION: Primary | ICD-10-CM

## 2023-12-06 DIAGNOSIS — M81.0 OSTEOPOROSIS, UNSPECIFIED OSTEOPOROSIS TYPE, UNSPECIFIED PATHOLOGICAL FRACTURE PRESENCE: ICD-10-CM

## 2023-12-06 DIAGNOSIS — N95.2 VAGINAL ATROPHY: ICD-10-CM

## 2023-12-06 DIAGNOSIS — Z12.31 ENCOUNTER FOR SCREENING MAMMOGRAM FOR BREAST CANCER: ICD-10-CM

## 2023-12-06 PROCEDURE — G0101 CA SCREEN;PELVIC/BREAST EXAM: HCPCS | Performed by: OBSTETRICS & GYNECOLOGY

## 2023-12-06 RX ORDER — TRIAMCINOLONE ACETONIDE 0.25 MG/G
CREAM TOPICAL
COMMUNITY
Start: 2023-11-17

## 2023-12-06 RX ORDER — CLOBETASOL PROPIONATE 0.5 MG/G
OINTMENT TOPICAL
Qty: 30 G | Refills: 2 | Status: SHIPPED | OUTPATIENT
Start: 2023-12-06

## 2023-12-06 NOTE — PROGRESS NOTES
Assessment/Plan   Diagnoses and all orders for this visit:    Women's annual routine gynecological examination  -     clobetasol (TEMOVATE) 0.05 % ointment; Apply topically daily at bedtime as needed (Vulvar irritation/lichen sclerosis changes)    Encounter for screening mammogram for breast cancer  -     Mammo screening bilateral w 3d & cad; Future    Lichen sclerosus et atrophicus  -     clobetasol (TEMOVATE) 0.05 % ointment; Apply topically daily at bedtime as needed (Vulvar irritation/lichen sclerosis changes)    Vaginal atrophy    Osteoporosis, unspecified osteoporosis type, unspecified pathological fracture presence    Rectocele    Pelvic pressure in female    Other orders  -     triamcinolone (KENALOG) 0.025 % cream    1. yearly exam-Pap smear done with reflex HPV, self breast awareness reviewed, calcium/vitamin D recommendations discussed, mammogram request given, colonoscopy done recently follow-up as per specialist.  2. lichen sclerosus-continues with clobetasol as needed, generally uses about once per month. Slight whitish discoloration noted at the midportion of the right labia minora without any raised or firm or ulcerative changes noted. She will continue to use clobetasol as needed, prescription refill was sent at patient request.  3.  Vaginal atrophy-moderate changes noted on exam.  Patient denies any complaints. She is not sexually active at this time. Vaginal lubrication/moisturizer sheet given, to use accordingly. 4.  History of HPV-noted in the past.  Pap with HPV was negative from October 2018, Pap was normal from 11/24/2021. Pap with reflex HPV done today, disposition as per findings. 5.  Previous history of submucous myoma/pelvic pressure/inclusion cyst-no current concerns  6. Osteoporosis/osteopenia-most recent DEXA scan 4/7/2023 with lumbar spine T-score -1.1, femoral neck -2.6. Mention is made that spine is increased 5.1%, hip increased 0.3%.   She continues on Fosamax as prescribed by Dr. Kathleen Herrera, has been on this about 2 years time. Calcium, vitamin D, weightbearing exercise recommended. Consider repeat DEXA scan 2025.  7.  Rectocele/episode of pelvic pressure-was seen by Dr. Christina Junior on 10/24/2023. Apparently, she was in [place] and walking much prior to that visit and noted possible prolapse of tissue. She has not noted any symptoms since then. Exam today is notable for some laxity anteriorly, good support of the uterus/cervix and grade 1 rectocele. She was counseled in detail about this. Given that she is asymptomatic, no intervention is required at this time. She will call or return with any concerns. Counseled about trying to prevent constipation as this could worsen symptoms with rectocele. Follow-up 1 year for follow-up visit or as needed. Subjective   Patient ID: Francesca Case is a 69 y.o. female. Vitals:    23 0827   BP: 118/64     Patient was seen today for follow-up visit. Please see assessment plan for details. The following portions of the patient's history were reviewed and updated as appropriate: allergies, current medications, past family history, past medical history, past social history, past surgical history, and problem list.  Past Medical History:   Diagnosis Date    Submucous leiomyoma of uterus 2014     History reviewed. No pertinent surgical history.   OB History    Para Term  AB Living   3 2 2   1 2   SAB IAB Ectopic Multiple Live Births   1              # Outcome Date GA Lbr Leroy/2nd Weight Sex Delivery Anes PTL Lv   3 SAB            2 Term            1 Term                Current Outpatient Medications:     alendronate (FOSAMAX) 70 mg tablet, , Disp: , Rfl:     buPROPion (WELLBUTRIN SR) 200 MG 12 hr tablet, Take by mouth, Disp: , Rfl:     carbidopa-levodopa (SINEMET CR)  mg per tablet, Take by mouth, Disp: , Rfl:     cholestyramine (QUESTRAN) 4 g packet, , Disp: , Rfl:     clobetasol (TEMOVATE) 0.05 % ointment, Apply topically daily at bedtime as needed (Vulvar irritation/lichen sclerosis changes), Disp: 30 g, Rfl: 2    doxepin (SINEquan) 10 mg capsule, , Disp: , Rfl:     LORazepam (ATIVAN) 0.5 mg tablet, Take by mouth, Disp: , Rfl:     methylPREDNISolone 4 MG tablet therapy pack, , Disp: , Rfl:     pregabalin (LYRICA) 50 mg capsule, , Disp: , Rfl:     simvastatin (ZOCOR) 40 mg tablet, Take by mouth, Disp: , Rfl:     triamcinolone (KENALOG) 0.025 % cream, , Disp: , Rfl:     buPROPion (WELLBUTRIN SR) 150 mg 12 hr tablet, , Disp: , Rfl:     LORazepam (ATIVAN) 1 mg tablet, , Disp: , Rfl:   Allergies   Allergen Reactions    Aspirin      Reaction Date: [id number];      Social History     Socioeconomic History    Marital status: /Civil Union     Spouse name: None    Number of children: None    Years of education: None    Highest education level: None   Occupational History    None   Tobacco Use    Smoking status: Never    Smokeless tobacco: Never   Vaping Use    Vaping Use: Never used   Substance and Sexual Activity    Alcohol use: No    Drug use: No    Sexual activity: Not Currently     Partners: Male   Other Topics Concern    None   Social History Narrative    None     Social Determinants of Health     Financial Resource Strain: Not on file   Food Insecurity: Not on file   Transportation Needs: Not on file   Physical Activity: Not on file   Stress: Not on file   Social Connections: Not on file   Intimate Partner Violence: Not on file   Housing Stability: Not on file     Family History   Problem Relation Age of Onset    No Known Problems Mother     No Known Problems Father        Review of Systems    Objective   Physical Exam  OBGyn Exam     Objective      /64 (BP Location: Left arm, Patient Position: Sitting)   Ht 5' 0.75" (1.543 m)   Wt 47.5 kg (104 lb 12.8 oz)   BMI 19.97 kg/m²     General:   alert and oriented, in no acute distress   Neck: normal to inspection and palpation   Breast: normal appearance, no masses or tenderness   Heart:    Lungs:    Abdomen: soft, non-tender, without masses or organomegaly   Vulva: Minimal white/hypopigmented area noted at the midportion of the right labia minora without any warmth or erythema or discharge. No other significant changes appreciated. Vagina: Moderately atrophic, without erythema or lesions or discharge. Minimal laxity noted anteriorly   Cervix: Moderately atrophic, without lesions or discharge or cervicitis.   No CMT   Uterus: normal size, anteverted, non-tender   Adnexa: no mass, fullness, tenderness   Rectum: negative, grade 1 rectocele    Psych:  Normal mood and affect   Skin:  Without obvious lesions   Eyes: symmetric, with normal movements and reactivity   Musculoskeletal:  Normal muscle tone and movements appreciated

## 2023-12-14 LAB
CYTOLOGIST CVX/VAG CYTO: NORMAL
DX ICD CODE: NORMAL
HPV GENOTYPE REFLEX: NORMAL
HPV I/H RISK 4 DNA CVX QL PROBE+SIG AMP: NEGATIVE
OTHER STN SPEC: NORMAL
PATH REPORT.FINAL DX SPEC: NORMAL
SL AMB NOTE:: NORMAL
SL AMB SPECIMEN ADEQUACY: NORMAL
SL AMB TEST METHODOLOGY: NORMAL

## 2024-12-11 ENCOUNTER — ANNUAL EXAM (OUTPATIENT)
Dept: GYNECOLOGY | Facility: CLINIC | Age: 79
End: 2024-12-11
Payer: COMMERCIAL

## 2024-12-11 VITALS
WEIGHT: 100.2 LBS | SYSTOLIC BLOOD PRESSURE: 110 MMHG | BODY MASS INDEX: 18.92 KG/M2 | HEIGHT: 61 IN | DIASTOLIC BLOOD PRESSURE: 66 MMHG

## 2024-12-11 DIAGNOSIS — M81.0 OSTEOPOROSIS, UNSPECIFIED OSTEOPOROSIS TYPE, UNSPECIFIED PATHOLOGICAL FRACTURE PRESENCE: ICD-10-CM

## 2024-12-11 DIAGNOSIS — Z01.419 WOMEN'S ANNUAL ROUTINE GYNECOLOGICAL EXAMINATION: Primary | ICD-10-CM

## 2024-12-11 DIAGNOSIS — L90.0 LICHEN SCLEROSUS ET ATROPHICUS: ICD-10-CM

## 2024-12-11 DIAGNOSIS — Z12.31 ENCOUNTER FOR SCREENING MAMMOGRAM FOR BREAST CANCER: ICD-10-CM

## 2024-12-11 DIAGNOSIS — D25.0 SUBMUCOUS LEIOMYOMA OF UTERUS: ICD-10-CM

## 2024-12-11 DIAGNOSIS — N95.2 VAGINAL ATROPHY: ICD-10-CM

## 2024-12-11 PROCEDURE — G0101 CA SCREEN;PELVIC/BREAST EXAM: HCPCS | Performed by: OBSTETRICS & GYNECOLOGY

## 2024-12-11 NOTE — PROGRESS NOTES
Assessment & Plan   Diagnoses and all orders for this visit:    Women's annual routine gynecological examination    Encounter for screening mammogram for breast cancer  -     Mammo screening bilateral w 3d and cad; Future    Lichen sclerosus et atrophicus    Vaginal atrophy    Submucous leiomyoma of uterus    Osteoporosis, unspecified osteoporosis type, unspecified pathological fracture presence  -     DXA bone density spine hip and pelvis; Future    Other orders  -     MAGNESIUM OXIDE, LAXATIVE, PO; Take 400 mg by mouth daily  -     Cholecalciferol 100 MCG (4000 UT) TABS; Take 4,000 Units by mouth daily    1. General Exam-Pap smear deferred, self breast awareness reviewed, calcium/vitamin D recommendations discussed, mammogram request given, colonoscopy done by Dr. Villalpando with hemorrhoids noted and no other findings per patient report.  2. lichen sclerosus-continues to use clobetasol as needed.  She used it about 2 weeks ago.  She is more symptomatic at the midportion of the right labia, where linear whitish discoloration is found.  Scant white discoloration noted at the midportion of the left labia, only occasionally requiring treatment there.  She generally uses clobetasol about once per month.  She does have refill from her dermatologist for treatment for another condition.  She will call with need for any refills.  3.  Mild to moderate atrophy-denies complaints.  She is not sexually active at this time.  Vaginal lubrication/moisturizer sheet given, to use as needed.  4.  Distant history of HPV-Pap with HPV was negative from 12/6/2020 3. Pap was deferred today as per current guidelines.  5. prior history of submucous myoma/pelvic pressure/inclusion cyst-no current concerns  6.  Osteoporosis-DEXA scan from 4/7/2023 with lumbar spine T-score -1.1, femoral neck -2.6.  Mention was made that spine increase to 5.1%, hip increased to 0.3%.  Prior DEXA scan April 2021 with femoral neck T-score -3.1.  She continues with  calcium, vitamin D, and weightbearing exercise along with Fosamax prescribed by Dr. Stern.  Repeat DEXA scan ordered 2025, request given.  7.  Rectocele-does note occasional pressure with bowel movement.  Grade 1 rectocele is noted.  It does seem to be associated with constipated bowel movements.  Practical recommendations were reviewed including high-fiber food and adequate fluid to try to prevent constipation.  This only occurs rarely.  She will call or return with any worsening symptoms.  Of note, had CT scan from 2023 with possible rectal finding.  Did see gastroenterologist, diagnosed with hemorrhoid which was noted on exam today.  No mass was appreciated on rectal exam today.  8.  Other-that is post nausea with weight loss over the summer, about 10 pound weight loss.  Fortunately, this has resolved.  No definite etiology was found as evaluated by gastroenterology.   now 57 years, congratulations given.  Follow-up 1 year for 1 year follow-up or as needed.    Subjective   Patient ID: Yany Torres is a 79 y.o. female.    Vitals:    [id number]   BP: 110/66     HPI    The following portions of the patient's history were reviewed and updated as appropriate: allergies, current medications, past family history, past medical history, past social history, past surgical history, and problem list.  Past Medical History:   Diagnosis Date    Submucous leiomyoma of uterus 2014     History reviewed. No pertinent surgical history.  OB History    Para Term  AB Living   3 2 2  1 2   SAB IAB Ectopic Multiple Live Births   1          # Outcome Date GA Lbr Leroy/2nd Weight Sex Type Anes PTL Lv   3 SAB            2 Term            1 Term                Current Outpatient Medications:     alendronate (FOSAMAX) 70 mg tablet, , Disp: , Rfl:     buPROPion (WELLBUTRIN SR) 200 MG 12 hr tablet, Take by mouth, Disp: , Rfl:     carbidopa-levodopa (SINEMET CR)  mg per tablet, Take by mouth, Disp:  , Rfl:     Cholecalciferol 100 MCG (4000 UT) TABS, Take 4,000 Units by mouth daily, Disp: , Rfl:     cholestyramine (QUESTRAN) 4 g packet, , Disp: , Rfl:     clobetasol (TEMOVATE) 0.05 % ointment, Apply topically daily at bedtime as needed (Vulvar irritation/lichen sclerosis changes), Disp: 30 g, Rfl: 2    LORazepam (ATIVAN) 0.5 mg tablet, Take by mouth, Disp: , Rfl:     MAGNESIUM OXIDE, LAXATIVE, PO, Take 400 mg by mouth daily, Disp: , Rfl:     pregabalin (LYRICA) 50 mg capsule, , Disp: , Rfl:     simvastatin (ZOCOR) 40 mg tablet, Take by mouth, Disp: , Rfl:     triamcinolone (KENALOG) 0.025 % cream, , Disp: , Rfl:     buPROPion (WELLBUTRIN SR) 150 mg 12 hr tablet, , Disp: , Rfl:     doxepin (SINEquan) 10 mg capsule, , Disp: , Rfl:     LORazepam (ATIVAN) 1 mg tablet, , Disp: , Rfl:     methylPREDNISolone 4 MG tablet therapy pack, , Disp: , Rfl:   Allergies   Allergen Reactions    Aspirin Angioedema     Reaction Date: [id number];     Social History     Socioeconomic History    Marital status: /Civil Union     Spouse name: None    Number of children: None    Years of education: None    Highest education level: None   Occupational History    None   Tobacco Use    Smoking status: Never    Smokeless tobacco: Never   Vaping Use    Vaping status: Never Used   Substance and Sexual Activity    Alcohol use: No    Drug use: No    Sexual activity: Not Currently     Partners: Male   Other Topics Concern    None   Social History Narrative    None     Social Drivers of Health     Financial Resource Strain: Not on file   Food Insecurity: Not on file   Transportation Needs: Not on file   Physical Activity: Not on file   Stress: Not on file   Social Connections: Not on file   Intimate Partner Violence: Not on file   Housing Stability: Not on file     Family History   Problem Relation Age of Onset    No Known Problems Mother     No Known Problems Father        Review of Systems    Objective   Physical Exam  OBGyn Exam  "    Objective      /66 (BP Location: Right arm, Patient Position: Sitting)   Ht 5' 0.5\" (1.537 m)   Wt 45.5 kg (100 lb 3.2 oz)   BMI 19.25 kg/m²     General:   alert and oriented, in no acute distress   Neck: normal to inspection and palpation   Breast: normal appearance, no masses or tenderness   Heart:    Lungs:    Abdomen: soft, non-tender, without masses or organomegaly   Vulva: Minimal linear white hypopigmentation noted at the midportion of the right labia minora/majora without any warmth or erythema or discharge or ulcerative changes.  Scant whitish discoloration noted at the midportion of the left minora/majora without other suspicious findings.   Vagina: Mild to moderate atrophy, without erythema or lesions or discharge   Cervix: Mild to moderate atrophy, without lesions or cervicitis.  No CMT   Uterus: top normal size, anteverted, non-tender   Adnexa: no mass, fullness, tenderness   Rectum: negative, grade 1 rectocele    Psych:  Normal mood and affect   Skin:  Without obvious lesions   Eyes: symmetric, with normal movements and reactivity   Musculoskeletal:  Normal muscle tone and movements appreciated       "